# Patient Record
Sex: FEMALE | Race: OTHER | Employment: FULL TIME
[De-identification: names, ages, dates, MRNs, and addresses within clinical notes are randomized per-mention and may not be internally consistent; named-entity substitution may affect disease eponyms.]

---

## 2024-10-31 ENCOUNTER — HOSPITAL ENCOUNTER (EMERGENCY)
Facility: HOSPITAL | Age: 29
Discharge: HOME | End: 2024-10-31
Attending: EMERGENCY MEDICINE

## 2024-10-31 VITALS
BODY MASS INDEX: 20.49 KG/M2 | SYSTOLIC BLOOD PRESSURE: 101 MMHG | DIASTOLIC BLOOD PRESSURE: 65 MMHG | RESPIRATION RATE: 16 BRPM | TEMPERATURE: 98.7 F | HEART RATE: 91 BPM | OXYGEN SATURATION: 100 % | WEIGHT: 120 LBS | HEIGHT: 64 IN

## 2024-10-31 DIAGNOSIS — J06.9 VIRAL UPPER RESPIRATORY TRACT INFECTION: Primary | ICD-10-CM

## 2024-10-31 LAB
FLUAV RNA SPEC QL NAA+PROBE: NOT DETECTED
FLUBV RNA SPEC QL NAA+PROBE: NOT DETECTED
SARS-COV-2 RNA RESP QL NAA+PROBE: NOT DETECTED
SOURCE: NORMAL

## 2024-10-31 PROCEDURE — 99283 EMERGENCY DEPT VISIT LOW MDM: CPT

## 2024-10-31 PROCEDURE — 87636 SARSCOV2 & INF A&B AMP PRB: CPT

## 2024-10-31 ASSESSMENT — LIFESTYLE VARIABLES
HOW OFTEN DO YOU HAVE A DRINK CONTAINING ALCOHOL: NEVER
HOW MANY STANDARD DRINKS CONTAINING ALCOHOL DO YOU HAVE ON A TYPICAL DAY: PATIENT DOES NOT DRINK

## 2024-10-31 ASSESSMENT — PAIN - FUNCTIONAL ASSESSMENT: PAIN_FUNCTIONAL_ASSESSMENT: NONE - DENIES PAIN

## 2024-11-01 NOTE — ED PROVIDER NOTES
EMERGENCY DEPARTMENT HISTORY AND PHYSICAL EXAM    10:51 PM      Date: 10/31/2024  Patient Name: Caterina Kwon    History of Presenting Illness     Chief Complaint   Patient presents with    Letter for School/Work    Cold Symptoms       History From:     Caterina Kwon is a 28 y.o. female presenting to [facility] ED with complaint of malaise, rhinorrhea, low grade fever that started today. Denies SOB, cough, sputum,  sore throat, purulent nasal discharge.        PCP: Unknown, Provider, MD    No current facility-administered medications for this encounter.     No current outpatient medications on file.       Past History     Past Medical History:  History reviewed. No pertinent past medical history.    Past Surgical History:  History reviewed. No pertinent surgical history.    Family History:  History reviewed. No pertinent family history.    Social History:  Social History     Tobacco Use    Smoking status: Never    Smokeless tobacco: Never   Substance Use Topics    Alcohol use: Not Currently    Drug use: Never       Allergies:  Allergies   Allergen Reactions    Coconut (Cocos Nucifera) Anaphylaxis and Hives       Review of Systems     As per HPI    Physical Exam   /65   Pulse 91   Temp 98.7 °F (37.1 °C) (Oral)   Resp 16   Ht 1.626 m (5' 4\")   Wt 54.4 kg (120 lb)   SpO2 100%   BMI 20.60 kg/m²     Pulmonary: CTAB, no wheezing, rales, rhonchi    Cardiac: RRR, no MRG    Oral: nonerythymatous oral mucosa with no tonsillar exudates    Neck: no cervical lymphadenopathy      Diagnostic Study Results     Labs -  Recent Results (from the past 12 hour(s))   COVID-19 & Influenza Combo    Collection Time: 10/31/24  9:35 PM    Specimen: Nasopharyngeal   Result Value Ref Range    Source Nasopharyngeal      SARS-CoV-2, PCR Not detected NOTD      Rapid Influenza A By PCR Not detected NOTD      Rapid Influenza B By PCR Not detected NOTD         Radiologic Studies -   No orders to display         Medical Decision Making   I

## 2024-11-01 NOTE — ED TRIAGE NOTES
Pt c/o cold symptoms since today.    Pt stated \"that she just need a work note\".    Pt ambulated to the room with a steady gait.